# Patient Record
Sex: FEMALE | Race: BLACK OR AFRICAN AMERICAN | NOT HISPANIC OR LATINO | Employment: UNEMPLOYED | URBAN - METROPOLITAN AREA
[De-identification: names, ages, dates, MRNs, and addresses within clinical notes are randomized per-mention and may not be internally consistent; named-entity substitution may affect disease eponyms.]

---

## 2018-10-20 ENCOUNTER — APPOINTMENT (EMERGENCY)
Dept: RADIOLOGY | Facility: HOSPITAL | Age: 33
End: 2018-10-20

## 2018-10-20 ENCOUNTER — HOSPITAL ENCOUNTER (EMERGENCY)
Facility: HOSPITAL | Age: 33
Discharge: HOME | End: 2018-10-21
Attending: EMERGENCY MEDICINE | Admitting: EMERGENCY MEDICINE

## 2018-10-20 ENCOUNTER — HOSPITAL ENCOUNTER (EMERGENCY)
Facility: HOSPITAL | Age: 33
Discharge: HOME | End: 2018-10-20
Attending: EMERGENCY MEDICINE | Admitting: EMERGENCY MEDICINE

## 2018-10-20 VITALS
HEIGHT: 63 IN | BODY MASS INDEX: 29.23 KG/M2 | DIASTOLIC BLOOD PRESSURE: 100 MMHG | HEART RATE: 114 BPM | TEMPERATURE: 98.7 F | WEIGHT: 165 LBS | SYSTOLIC BLOOD PRESSURE: 163 MMHG | OXYGEN SATURATION: 98 % | RESPIRATION RATE: 18 BRPM

## 2018-10-20 VITALS
DIASTOLIC BLOOD PRESSURE: 81 MMHG | SYSTOLIC BLOOD PRESSURE: 131 MMHG | TEMPERATURE: 98.4 F | RESPIRATION RATE: 18 BRPM | OXYGEN SATURATION: 94 % | HEART RATE: 85 BPM

## 2018-10-20 DIAGNOSIS — M79.646 THUMB PAIN: ICD-10-CM

## 2018-10-20 DIAGNOSIS — R11.2 NAUSEA & VOMITING: ICD-10-CM

## 2018-10-20 DIAGNOSIS — R59.1 LYMPHADENOPATHY OF HEAD AND NECK: Primary | ICD-10-CM

## 2018-10-20 DIAGNOSIS — J02.9 SORE THROAT: Primary | ICD-10-CM

## 2018-10-20 LAB
ANION GAP SERPL CALCULATED.3IONS-SCNC: 11 MMOL/L (ref 4–13)
BASOPHILS # BLD AUTO: 0.04 THOUSANDS/ΜL (ref 0–0.1)
BASOPHILS NFR BLD AUTO: 1 % (ref 0–1)
BUN SERPL-MCNC: 10 MG/DL (ref 5–25)
CALCIUM SERPL-MCNC: 8 MG/DL (ref 8.3–10.1)
CHLORIDE SERPL-SCNC: 107 MMOL/L (ref 100–108)
CO2 SERPL-SCNC: 22 MMOL/L (ref 21–32)
CREAT SERPL-MCNC: 0.79 MG/DL (ref 0.6–1.3)
EOSINOPHIL # BLD AUTO: 0.02 THOUSAND/ΜL (ref 0–0.61)
EOSINOPHIL NFR BLD AUTO: 0 % (ref 0–6)
ERYTHROCYTE [DISTWIDTH] IN BLOOD BY AUTOMATED COUNT: 17.2 % (ref 11.6–15.1)
GFR SERPL CREATININE-BSD FRML MDRD: 114 ML/MIN/1.73SQ M
GLUCOSE SERPL-MCNC: 80 MG/DL (ref 65–140)
HCG SERPL QL: NEGATIVE
HCT VFR BLD AUTO: 32 % (ref 34.8–46.1)
HGB BLD-MCNC: 10.3 G/DL (ref 11.5–15.4)
IMM GRANULOCYTES # BLD AUTO: 0.01 THOUSAND/UL (ref 0–0.2)
IMM GRANULOCYTES NFR BLD AUTO: 0 % (ref 0–2)
LYMPHOCYTES # BLD AUTO: 2.79 THOUSANDS/ΜL (ref 0.6–4.47)
LYMPHOCYTES NFR BLD AUTO: 44 % (ref 14–44)
MCH RBC QN AUTO: 29.8 PG (ref 26.8–34.3)
MCHC RBC AUTO-ENTMCNC: 32.2 G/DL (ref 31.4–37.4)
MCV RBC AUTO: 93 FL (ref 82–98)
MONOCYTES # BLD AUTO: 0.43 THOUSAND/ΜL (ref 0.17–1.22)
MONOCYTES NFR BLD AUTO: 7 % (ref 4–12)
NEUTROPHILS # BLD AUTO: 3.01 THOUSANDS/ΜL (ref 1.85–7.62)
NEUTS SEG NFR BLD AUTO: 48 % (ref 43–75)
NRBC BLD AUTO-RTO: 0 /100 WBCS
PLATELET # BLD AUTO: 198 THOUSANDS/UL (ref 149–390)
PMV BLD AUTO: 9.8 FL (ref 8.9–12.7)
POTASSIUM SERPL-SCNC: 3.4 MMOL/L (ref 3.5–5.3)
RBC # BLD AUTO: 3.46 MILLION/UL (ref 3.81–5.12)
S PYO AG THROAT QL: NEGATIVE
SODIUM SERPL-SCNC: 140 MMOL/L (ref 136–145)
WBC # BLD AUTO: 6.3 THOUSAND/UL (ref 4.31–10.16)

## 2018-10-20 PROCEDURE — 96375 TX/PRO/DX INJ NEW DRUG ADDON: CPT

## 2018-10-20 PROCEDURE — 99284 EMERGENCY DEPT VISIT MOD MDM: CPT

## 2018-10-20 PROCEDURE — 73140 X-RAY EXAM OF FINGER(S): CPT

## 2018-10-20 PROCEDURE — 87430 STREP A AG IA: CPT | Performed by: STUDENT IN AN ORGANIZED HEALTH CARE EDUCATION/TRAINING PROGRAM

## 2018-10-20 PROCEDURE — 96374 THER/PROPH/DIAG INJ IV PUSH: CPT

## 2018-10-20 PROCEDURE — 70491 CT SOFT TISSUE NECK W/DYE: CPT

## 2018-10-20 PROCEDURE — 85025 COMPLETE CBC W/AUTO DIFF WBC: CPT | Performed by: EMERGENCY MEDICINE

## 2018-10-20 PROCEDURE — 96361 HYDRATE IV INFUSION ADD-ON: CPT

## 2018-10-20 PROCEDURE — 70360 X-RAY EXAM OF NECK: CPT

## 2018-10-20 PROCEDURE — 80048 BASIC METABOLIC PNL TOTAL CA: CPT | Performed by: EMERGENCY MEDICINE

## 2018-10-20 PROCEDURE — 36415 COLL VENOUS BLD VENIPUNCTURE: CPT | Performed by: EMERGENCY MEDICINE

## 2018-10-20 PROCEDURE — 84703 CHORIONIC GONADOTROPIN ASSAY: CPT | Performed by: STUDENT IN AN ORGANIZED HEALTH CARE EDUCATION/TRAINING PROGRAM

## 2018-10-20 RX ORDER — ACETAMINOPHEN 325 MG/1
TABLET ORAL
Qty: 30 TABLET | Refills: 0 | Status: SHIPPED | OUTPATIENT
Start: 2018-10-20

## 2018-10-20 RX ORDER — ONDANSETRON 2 MG/ML
4 INJECTION INTRAMUSCULAR; INTRAVENOUS ONCE
Status: COMPLETED | OUTPATIENT
Start: 2018-10-20 | End: 2018-10-20

## 2018-10-20 RX ORDER — DIAZEPAM 5 MG/ML
10 INJECTION, SOLUTION INTRAMUSCULAR; INTRAVENOUS ONCE
Status: COMPLETED | OUTPATIENT
Start: 2018-10-20 | End: 2018-10-20

## 2018-10-20 RX ORDER — IBUPROFEN 600 MG/1
600 TABLET ORAL EVERY 6 HOURS PRN
Qty: 30 TABLET | Refills: 0 | Status: SHIPPED | OUTPATIENT
Start: 2018-10-20

## 2018-10-20 RX ORDER — ONDANSETRON 4 MG/1
4 TABLET, FILM COATED ORAL EVERY 6 HOURS
Qty: 12 TABLET | Refills: 0 | Status: SHIPPED | OUTPATIENT
Start: 2018-10-20

## 2018-10-20 RX ORDER — IBUPROFEN 400 MG/1
800 TABLET ORAL ONCE
Status: COMPLETED | OUTPATIENT
Start: 2018-10-20 | End: 2018-10-20

## 2018-10-20 RX ADMIN — ONDANSETRON 4 MG: 2 INJECTION INTRAMUSCULAR; INTRAVENOUS at 21:50

## 2018-10-20 RX ADMIN — SODIUM CHLORIDE 1000 ML: 0.9 INJECTION, SOLUTION INTRAVENOUS at 21:54

## 2018-10-20 RX ADMIN — IBUPROFEN 800 MG: 400 TABLET ORAL at 05:14

## 2018-10-20 RX ADMIN — Medication 10 MG: at 21:49

## 2018-10-20 RX ADMIN — IOHEXOL 85 ML: 350 INJECTION, SOLUTION INTRAVENOUS at 23:08

## 2018-10-20 NOTE — ED ATTENDING ATTESTATION
Maria L Anne MD, saw and evaluated the patient  I have discussed the patient with the resident/non-physician practitioner and agree with the resident's/non-physician practitioner's findings, Plan of Care, and MDM as documented in the resident's/non-physician practitioner's note, except where noted  All available labs and Radiology studies were reviewed  At this point I agree with the current assessment done in the Emergency Department  I have conducted an independent evaluation of this patient including a focused history of:    Emergency Department Note- Deyanira Carrillo 35 y o  female MRN: [id number]    Unit/Bed#: ED 05 Encounter: [id number]    Deyanira Carrillo is a 35 y o  female who presents with   Chief Complaint   Patient presents with    Facial Swelling     Pt reports the her lips and neck started swelling yesterday  Denies eating anything different  Had similiar type swelling in the past as was told she had an inflammatory reaction  Airway intact         History of Present Illness   HPI:  Deyanira Carrillo is a 35 y o  female who presents for evaluation of:  Swelling by her mandible  Patient states recent admission for same  Patient is able to swallow without difficulty  Patient denies difficulty breathing  Patient also slammed car door on thumb accidentally yesterday  Review of Systems   Constitutional: Negative for fatigue and fever  HENT: Negative for trouble swallowing and voice change  Musculoskeletal: Negative for neck pain and neck stiffness  All other systems reviewed and are negative  Historical Information   Past Medical History:   Diagnosis Date    Diabetes mellitus ([facility] [address] )      History reviewed  No pertinent surgical history    Social History   History   Alcohol Use    Yes     History   Drug Use    Types: Marijuana     History   Smoking Status    Current Every Day Smoker    Packs/day: 0 50    Types: Cigarettes   Smokeless Tobacco    Not on file     Family History: non-contributory    Meds/Allergies   all medications and allergies reviewed  No Known Allergies    Objective   First Vitals:   Blood Pressure: 131/81 (10/20/18 0423)  Pulse: 85 (10/20/18 0423)  Temperature: 98 4 °F (36 9 °C) (10/20/18 0423)  Temp Source: Oral (10/20/18 0423)  Respirations: 18 (10/20/18 0423)  SpO2: 94 % (10/20/18 0423)    Current Vitals:   Blood Pressure: 131/81 (10/20/18 0423)  Pulse: 85 (10/20/18 0423)  Temperature: 98 4 °F (36 9 °C) (10/20/18 0423)  Temp Source: Oral (10/20/18 0423)  Respirations: 18 (10/20/18 0423)  SpO2: 94 % (10/20/18 0423)    No intake or output data in the 24 hours ending 10/20/18 0505    Invasive Devices          No matching active lines, drains, or airways          Physical Exam   Constitutional: She is oriented to person, place, and time  She appears well-developed and well-nourished  HENT:   Head: Normocephalic and atraumatic  No trismus   Eyes: Pupils are equal, round, and reactive to light  Conjunctivae are normal    Neck: Normal range of motion  Neck supple  No tracheal deviation present  Musculoskeletal: Normal range of motion  She exhibits tenderness (right thumb)  Neurological: She is alert and oriented to person, place, and time  Skin: Skin is warm and dry  Psychiatric: She has a normal mood and affect  Her behavior is normal  Judgment and thought content normal    Nursing note and vitals reviewed  Medical Decision Makin  Submandibular discomfort without evidence of trismus or infection  2  Right thumb pain: xray r/o fx    No results found for this or any previous visit (from the past 36 hour(s))  No orders to display         Portions of the record may have been created with voice recognition software  Occasional wrong word or "sound a like" substitutions may have occurred due to the inherent limitations of voice recognition software  Read the chart carefully and recognize, using context, where substitutions have occurred

## 2018-10-20 NOTE — DISCHARGE INSTRUCTIONS
Lymphadenopathy   WHAT YOU NEED TO KNOW:   Lymphadenopathy is swelling of your lymph nodes  Lymph nodes are small organs that are part of your immune system  The lymph nodes are found throughout your body  They are most easily felt in your neck, under your arms, and near your groin  Lymphadenopathy can occur in one or more areas of your body  It is usually caused by an infection  DISCHARGE INSTRUCTIONS:   Return to the emergency department if:   · The swollen lymph nodes bleed  · You have swollen lymph nodes in your neck that affect your breathing or swallowing  Contact your healthcare provider if:   · You have a fever  · You have a new swollen and painful lymph node  · You have a skin rash  · Your lymph node remains swollen or painful, or it gets bigger  · Your lymph node has red streaks around it, or the skin around the lymph node is red  · You have questions or concerns about your condition or care  Follow up with your healthcare provider as directed:  Write down your questions so you remember to ask them during your visits  Self-care:   · Do not poke or squeeze  the swollen lymph nodes  · Apply heat to the swollen glands  You may use warm compresses, or an electric heating pad set on low  · Rest as needed  If you have a fever, rest until your temperature returns to normal  Return to your normal daily activities slowly after your fever is gone  © 2017 [address] Information is for End User's use only and may not be sold, redistributed or otherwise used for commercial purposes  All illustrations and images included in CareNotes® are the copyrighted property of A D A M , Inc  or Akira Andrade  The above information is an  only  It is not intended as medical advice for individual conditions or treatments  Talk to your doctor, nurse or pharmacist before following any medical regimen to see if it is safe and effective for you

## 2018-10-20 NOTE — ED PROVIDER NOTES
History  Chief Complaint   Patient presents with    Facial Swelling     Pt reports the her lips and neck started swelling yesterday  Denies eating anything different  Had similiar type swelling in the past as was told she had an inflammatory reaction  Airway intact     This is a 59-year-old female with previous medical history of diabetes and a hospital admission 3 months ago for swelling of her neck  Patient presents with 2 days of swelling in her submental region  Patient notes problem swallowing as well as breathing over the course of the last 2 days  Patient is a smoker  Patient also smokes marijuana  Patient denies fevers  Patient is able to swallow liquids without pain  Patient notes no problems with speaking  Neck Pain   Pain location: submental   Quality:  Aching  Pain radiates to:  Does not radiate  Pain severity:  Moderate  Onset quality:  Gradual  Timing:  Constant  Progression:  Worsening  Chronicity:  New      Prior to Admission Medications   Prescriptions Last Dose Informant Patient Reported? Taking?   metFORMIN (GLUCOPHAGE) 500 mg tablet  Self Yes Yes   Sig: Take 500 mg by mouth 2 (two) times a day with meals      Facility-Administered Medications: None       Past Medical History:   Diagnosis Date    [facility]        History reviewed  No pertinent surgical history  History reviewed  No pertinent family history  I have reviewed and agree with the history as documented  Social History   Substance Use Topics    Smoking status: Current Every Day Smoker     Packs/day: 0 50     Types: Cigarettes    Smokeless tobacco: Not on file    Alcohol use Yes        Review of Systems   Musculoskeletal: Positive for neck pain         Physical Exam  ED Triage Vitals [10/20/18 0423]   Temperature Pulse Respirations Blood Pressure SpO2   98 4 °F (36 9 °C) 85 18 131/81 94 %      Temp Source Heart Rate Source Patient Position - Orthostatic VS BP Location FiO2 (%)   Oral Monitor Lying Right arm --      Pain Score       No Pain           Orthostatic Vital Signs  Vitals:    10/20/18 0423   BP: 131/81   Pulse: 85   Patient Position - Orthostatic VS: Lying       Physical Exam   Constitutional: She appears well-developed and well-nourished  No distress  HENT:   Head: Normocephalic and atraumatic  Right Ear: External ear normal    Left Ear: External ear normal    No mass seen underneath the patient's tongue  Patient clearing secretions fine and speaking without any problem  Eyes: Conjunctivae and EOM are normal    Neck: No JVD present  No tracheal deviation present  Problem lymph nodes in the submental area  Cardiovascular: Normal rate, regular rhythm, normal heart sounds and intact distal pulses  No murmur heard  Pulmonary/Chest: Breath sounds normal  No stridor  No respiratory distress  She has no wheezes  She has no rales  Abdominal: Soft  Bowel sounds are normal  She exhibits no distension and no mass  There is no tenderness  There is no rebound and no guarding  Genitourinary:   Genitourinary Comments: Deferred   Musculoskeletal: She exhibits no edema, tenderness or deformity  Neurological: She exhibits normal muscle tone  Coordination normal    Skin: Skin is warm and dry  Capillary refill takes less than 2 seconds  No rash noted  She is not diaphoretic  No erythema  No pallor  Psychiatric: She has a normal mood and affect  Her behavior is normal  Judgment and thought content normal    Nursing note reviewed        ED Medications  Medications   ibuprofen (MOTRIN) tablet 800 mg (800 mg Oral Given 10/20/18 0514)       Diagnostic Studies  Results Reviewed     Procedure Component Value Units Date/Time    Basic metabolic panel [[id number]]  (Abnormal) Collected:  10/20/18 0527    Lab Status:  Final result Specimen:  Blood from Arm, Right Updated:  10/20/18 0612     Sodium 140 mmol/L      Potassium 3 4 (L) mmol/L      Chloride 107 mmol/L      CO2 22 mmol/L      ANION GAP 11 mmol/L      BUN 10 mg/dL      Creatinine 0 79 mg/dL      Glucose 80 mg/dL      Calcium 8 0 (L) mg/dL      eGFR 114 ml/min/1 73sq m     Narrative:         National Kidney Disease Education Program recommendations are as follows:  GFR calculation is accurate only with a steady state creatinine  Chronic Kidney disease less than 60 ml/min/1 73 sq  meters  Kidney failure less than 15 ml/min/1 73 sq  meters  CBC and differential [[id number]]  (Abnormal) Collected:  10/20/18 0527    Lab Status:  Final result Specimen:  Blood from Arm, Right Updated:  10/20/18 0547     WBC 6 30 Thousand/uL      RBC 3 46 (L) Million/uL      Hemoglobin 10 3 (L) g/dL      Hematocrit 32 0 (L) %      MCV 93 fL      MCH 29 8 pg      MCHC 32 2 g/dL      RDW 17 2 (H) %      MPV 9 8 fL      Platelets 834 Thousands/uL      nRBC 0 /100 WBCs      Neutrophils Relative 48 %      Immat GRANS % 0 %      Lymphocytes Relative 44 %      Monocytes Relative 7 %      Eosinophils Relative 0 %      Basophils Relative 1 %      Neutrophils Absolute 3 01 Thousands/µL      Immature Grans Absolute 0 01 Thousand/uL      Lymphocytes Absolute 2 79 Thousands/µL      Monocytes Absolute 0 43 Thousand/µL      Eosinophils Absolute 0 02 Thousand/µL      Basophils Absolute 0 04 Thousands/µL                  XR neck soft tissue   ED Interpretation by Pilar Wilkerson DO (10/20 9253)   No Epiglotitis      XR thumb right first digit-min 2v   ED Interpretation by Pilar Wilkerson DO (10/20 0489)   No Fracture            Procedures  Procedures      Phone Consults  ED Phone Contact    ED Course                               MDM  Number of Diagnoses or Management Options  Lymphadenopathy of head and neck: new and requires workup  Thumb pain: new and requires workup  Diagnosis management comments: 70-year-old female presenting with swelling in her neck  Differential diagnosis includes ludwigs angina, normal lymphadenopathy    Patient was evaluated with x-ray neck soft tissue  Patient also want to be evaluated for her right thumb which sustained trauma when she shot her car door on it  X-ray of the neck and thumb were without abnormalities  Patient also asked that I evaluate her with blood work  I ordered a CBC and BMP which were normal   Patient was given 800 mg of ibuprofen  Patient will be discharged home  Patient be given return precautions for troubles swallowing liquids, change in voice, respiratory distress  Amount and/or Complexity of Data Reviewed  Clinical lab tests: ordered and reviewed  Tests in the radiology section of CPT®: reviewed and ordered  Review and summarize past medical records: yes  Independent visualization of images, tracings, or specimens: yes    Risk of Complications, Morbidity, and/or Mortality  Presenting problems: moderate  Diagnostic procedures: high  Management options: moderate      CritCare Time    Disposition  Final diagnoses:   Lymphadenopathy of head and neck   Thumb pain     Time reflects when diagnosis was documented in both MDM as applicable and the Disposition within this note     Time User Action Codes Description Comment    10/20/2018  6:33 AM Elian Miranda [R59 1] Lymphadenopathy of head and neck     10/20/2018  6:34 AM Elian Miranda [M79 646] Thumb pain       ED Disposition     ED Disposition Condition Comment    Discharge  [facility] discharge to home/self care  Condition at discharge: Good        Follow-up Information     Follow up With Specialties Details Why [address] [facility] Emergency Department Emergency Medicine  if respiratory distress, inability to swallow liquids  [address] [facility], [address] [place] [address], [id number]          Patient's Medications   Discharge Prescriptions    No medications on file     No discharge procedures on file      ED Provider  Attending physically available and evaluated Worthy William  I managed the patient along with the ED Attending      Electronically Signed by         Jessica Brice DO  10/20/18 0926

## 2018-10-21 NOTE — ED PROVIDER NOTES
History  Chief Complaint   Patient presents with    Sore Throat - Complicated     pt stated for the past 24 hours, sore throat, and unable to drink/eat anything  was seen treated and released earlier today for the same, see earlier charting     This is a  26-year-old female with a past medical history of diabetes who presents to the emergency department this evening after being seen earlier today with a sore throat and nausea/vomiting  Patient states that she has not been able to keep much food down since leaving the hospital earlier today she can seen in the drink any fluids because of this  She is requesting IV hydration  Patient states that she is urinated twice in sleeping hospital   She denies any other pain, shortness of breath, chest pain, headache, change in vision fever, chills, abdominal pain, dysuria, hematuria, diarrhea, constipation, or any numbness, weakness, or tingling  Patient states that she drinks about pt of hard liquor a day and the last time she had anything to drink was 24 hours ago  She denies any history of getting shakes without drinking or any history of seizures when she doesn't drink  Prior to Admission Medications   Prescriptions Last Dose Informant Patient Reported? Taking?   metFORMIN (GLUCOPHAGE) 500 mg tablet  Self Yes No   Sig: Take 500 mg by mouth 2 (two) times a day with meals      Facility-Administered Medications: None       Past Medical History:   Diagnosis Date    [facility]        History reviewed  No pertinent surgical history  History reviewed  No pertinent family history  I have reviewed and agree with the history as documented  Social History   Substance Use Topics    Smoking status: Current Every Day Smoker     Packs/day: 0 50     Types: Cigarettes    Smokeless tobacco: Not on file    Alcohol use Yes        Review of Systems   Constitutional: Negative for chills, fatigue and fever  HENT: Positive for sore throat   Negative for congestion, rhinorrhea and sinus pressure  Eyes: Negative for visual disturbance  Respiratory: Negative for cough and shortness of breath  Cardiovascular: Negative for chest pain  Gastrointestinal: Positive for nausea and vomiting  Negative for abdominal pain, constipation and diarrhea  Genitourinary: Negative for dysuria, frequency, hematuria and urgency  Musculoskeletal: Negative for arthralgias and myalgias  Skin: Negative for color change and rash  Neurological: Negative for dizziness, light-headedness and numbness  Physical Exam  ED Triage Vitals [10/20/18 1940]   Temperature Pulse Respirations Blood Pressure SpO2   98 7 °F (37 1 °C) (!) 114 18 163/100 98 %      Temp Source Heart Rate Source Patient Position - Orthostatic VS BP Location FiO2 (%)   Tympanic Monitor Lying Left arm --      Pain Score       Worst Possible Pain           Orthostatic Vital Signs  Vitals:    10/20/18 1940   BP: 163/100   Pulse: (!) 114   Patient Position - Orthostatic VS: Lying       Physical Exam   Constitutional: She is oriented to person, place, and time  She appears well-developed and well-nourished  No distress  HENT:   Head: Normocephalic and atraumatic  Mouth/Throat: Oropharynx is clear and moist  No oropharyngeal exudate  Submental space is soft and nontender   Eyes: Pupils are equal, round, and reactive to light  Conjunctivae and EOM are normal  Right eye exhibits no discharge  Left eye exhibits no discharge  No scleral icterus  Neck: Normal range of motion  Neck supple  No JVD present  Cardiovascular: Normal rate, regular rhythm and normal heart sounds  Exam reveals no gallop and no friction rub  No murmur heard  Pulmonary/Chest: Effort normal and breath sounds normal  No stridor  No respiratory distress  She has no wheezes  She has no rales  Abdominal: Soft  Bowel sounds are normal  She exhibits no distension  There is no tenderness  There is no guarding     Musculoskeletal: Normal range of motion  She exhibits no edema, tenderness or deformity  Lymphadenopathy:     She has cervical adenopathy (Anterior)  Neurological: She is alert and oriented to person, place, and time  No cranial nerve deficit or sensory deficit  She exhibits normal muscle tone  Skin: Skin is warm and dry  No rash noted  She is not diaphoretic  No erythema  No pallor  Psychiatric: She has a normal mood and affect  Her behavior is normal    Nursing note and vitals reviewed  ED Medications  Medications   sodium chloride 0 9 % bolus 1,000 mL (0 mL Intravenous Stopped 10/20/18 2318)   ondansetron (ZOFRAN) injection 4 mg (4 mg Intravenous Given 10/20/18 2150)   diazepam (VALIUM) injection 10 mg (10 mg Intravenous Given 10/20/18 2149)   iohexol (OMNIPAQUE) 350 MG/ML injection (SINGLE-DOSE) 85 mL (85 mL Intravenous Given 10/20/18 2308)       Diagnostic Studies  Results Reviewed     Procedure Component Value Units Date/Time    hCG, qualitative pregnancy [[id number]]  (Normal) Collected:  10/20/18 0527    Lab Status:  Final result Specimen:  Blood from Arm, Right Updated:  10/20/18 2256     Preg, Serum Negative    Rapid Strep A Screen Only, Adults [[id number]]  (Normal) Collected:  10/20/18 2217    Lab Status:  Final result Specimen:  Throat from Throat Updated:  10/20/18 2236     Rapid Strep A Screen Negative                 CT soft tissue neck   Final Result by Avery Tucker MD (10/20 2331)      Mild enlargement of the palatine and adenoid tonsils which may be due to tonsillitis  No discrete fluid collection to suggest drainable abscess  No retropharyngeal edema or fluid  Workstation performed: [id number]               Procedures  Procedures      Phone Consults  ED Phone Contact    ED Course                               MDM  Number of Diagnoses or Management Options  Nausea & vomiting:   Sore throat:   Diagnosis management comments: Patient's CT scan was unremarkable    Her nausea and vomiting was controlled with Zofran IV  A single dose of diazepam was given case the patient was suffering from any form of withdrawal from alcohol but she did not elicit any other signs  She was discharged home in good condition with instructions to follow up with the info link number in order to find a primary care physician or return to the emergency department sooner should she develop any new or concerning symptoms  CritCare Time    Disposition  Final diagnoses:   Sore throat   Nausea & vomiting     Time reflects when diagnosis was documented in both MDM as applicable and the Disposition within this note     Time User Action Codes Description Comment    10/20/2018 11:48 PM Franco Cabrera Add [J02 9] Sore throat     10/20/2018 11:48 PM Franco Cabrera Add [R11 2] Nausea & vomiting       ED Disposition     ED Disposition Condition Comment    Discharge  Carole Viveros discharge to home/self care  Condition at discharge: Good        Follow-up Information     Follow up With Specialties Details Why Contact Info Additional [facility] [address] [place] [address],[address]    [phone]       [facility] Emergency Department Emergency Medicine  If symptoms worsen [address] [place] [address]  [phone]  ED, [address] [place] [address], [facility]          Discharge Medication List as of 10/20/2018 11:48 PM      CONTINUE these medications which have NOT CHANGED    Details   metFORMIN (GLUCOPHAGE) 500 mg tablet Take 500 mg by mouth 2 (two) times a day with meals, Historical Med           No discharge procedures on file  ED Provider  Attending physically available and evaluated Carole Viveros I managed the patient along with the ED Attending      Electronically Signed by         Whit Ojeda MD  10/21/18 5753

## 2018-10-21 NOTE — ED ATTENDING ATTESTATION
Carmen Gloria DO, saw and evaluated the patient  I have discussed the patient with the resident/non-physician practitioner and agree with the resident's/non-physician practitioner's findings, Plan of Care, and MDM as documented in the resident's/non-physician practitioner's note, except where noted  All available labs and Radiology studies were reviewed  At this point I agree with the current assessment done in the Emergency Department  I have conducted an independent evaluation of this patient a history and physical is as follows:    34 yo female presents for re-evaluation of sore throat, neck swelling  States she is unable to swallow fluids, can't eat for 2 days  Symptoms rated severe, localized to neck/throat, worse with attempted swallowing  Non radiating  Denies fever, CP/SOB, n/v, neck pain or stiffness  No other c/o at this time    vacc UTD      POP no exudates  Uvula midline  Bilateral submandibular nodes, TTP  Space is soft  Neck supple no meningismus  No neck masses noted  No stridor  Pt tolerating secretions  Lungs CTAB      Imp: worsening throat pain, swelling(?) plan: CT neck with IV contrast, rapid strep, tx sx, reassess        Critical Care Time  CritCare Time    Procedures

## 2018-10-21 NOTE — DISCHARGE INSTRUCTIONS
Acute Nausea and Vomiting   WHAT YOU NEED TO KNOW:   Acute nausea and vomiting start suddenly, worsen quickly, and last a short time  DISCHARGE INSTRUCTIONS:   Seek care immediately if:   · You see blood in your vomit or your bowel movements  · You have sudden, severe pain in your chest and upper abdomen after hard vomiting or retching  · You have swelling in your neck and chest      · You are dizzy, cold, and thirsty and your eyes and mouth are dry  · You are urinating very little or not at all  · You have muscle weakness, leg cramps, and trouble breathing  · Your heart is beating much faster than normal      · You continue to vomit for more than 48 hours  Contact your healthcare provider if:   · You have frequent dry heaves (vomiting but nothing comes out)  · Your nausea and vomiting does not get better or go away after you use medicine  · You have questions or concerns about your condition or treatment  Medicines: You may need any of the following:  · Medicines  may be given to calm your stomach and stop your vomiting  You may also need medicines to help you feel more relaxed or to stop nausea and vomiting caused by motion sickness  · Gastrointestinal stimulants  are used to help empty your stomach and bowels  This may help decrease nausea and vomiting  · Take your medicine as directed  Contact your healthcare provider if you think your medicine is not helping or if you have side effects  Tell him or her if you are allergic to any medicine  Keep a list of the medicines, vitamins, and herbs you take  Include the amounts, and when and why you take them  Bring the list or the pill bottles to follow-up visits  Carry your medicine list with you in case of an emergency  Prevent or manage acute nausea and vomiting:   · Do not drink alcohol  Alcohol may upset or irritate your stomach  Too much alcohol can also cause acute nausea and vomiting  · Control stress    Headaches due to stress may cause nausea and vomiting  Find ways to relax and manage your stress  Get more rest and sleep  · Drink more liquids as directed  Vomiting can lead to dehydration  It is important to drink more liquids to help replace lost body fluids  Ask your healthcare provider how much liquid to drink each day and which liquids are best for you  Your provider may recommend that you drink an oral rehydration solution (ORS)  ORS contains water, salts, and sugar that are needed to replace the lost body fluids  Ask what kind of ORS to use, how much to drink, and where to get it  · Eat smaller meals, more often  Eat small amounts of food every 2 to 3 hours, even if you are not hungry  Food in your stomach may decrease your nausea  · Talk to your healthcare provider before you take over-the-counter (OTC) medicines  These medicines can cause serious problems if you use certain other medicines, or you have a medical condition  You may have problems if you use too much or use them for longer than the label says  Follow directions on the label carefully  Follow up with your healthcare provider as directed:  Write down your questions so you remember to ask them during your visits  © 2017 [facility] Information is for End User's use only and may not be sold, redistributed or otherwise used for commercial purposes  All illustrations and images included in CareNotes® are the copyrighted property of [email or web address]  or Akira Andrade  The above information is an  only  It is not intended as medical advice for individual conditions or treatments  Talk to your doctor, nurse or pharmacist before following any medical regimen to see if it is safe and effective for you